# Patient Record
Sex: MALE | Race: ASIAN
[De-identification: names, ages, dates, MRNs, and addresses within clinical notes are randomized per-mention and may not be internally consistent; named-entity substitution may affect disease eponyms.]

---

## 2020-11-20 ENCOUNTER — HOSPITAL ENCOUNTER (EMERGENCY)
Dept: HOSPITAL 76 - ED | Age: 65
Discharge: HOME | End: 2020-11-20
Payer: SELF-PAY

## 2020-11-20 VITALS — DIASTOLIC BLOOD PRESSURE: 77 MMHG | SYSTOLIC BLOOD PRESSURE: 120 MMHG

## 2020-11-20 DIAGNOSIS — I10: ICD-10-CM

## 2020-11-20 DIAGNOSIS — N13.2: Primary | ICD-10-CM

## 2020-11-20 LAB
ALBUMIN DIAFP-MCNC: 4.3 G/DL (ref 3.2–5.5)
ALBUMIN/GLOB SERPL: 1.5 {RATIO} (ref 1–2.2)
ALP SERPL-CCNC: 49 IU/L (ref 42–121)
ALT SERPL W P-5'-P-CCNC: 29 IU/L (ref 10–60)
ANION GAP SERPL CALCULATED.4IONS-SCNC: 9 MMOL/L (ref 6–13)
AST SERPL W P-5'-P-CCNC: 29 IU/L (ref 10–42)
BASOPHILS NFR BLD AUTO: 0 10^3/UL (ref 0–0.1)
BASOPHILS NFR BLD AUTO: 0.2 %
BILIRUB BLD-MCNC: 1.5 MG/DL (ref 0.2–1)
BUN SERPL-MCNC: 27 MG/DL (ref 6–20)
CALCIUM UR-MCNC: 8.9 MG/DL (ref 8.5–10.3)
CHLORIDE SERPL-SCNC: 105 MMOL/L (ref 101–111)
CLARITY UR REFRACT.AUTO: (no result)
CO2 SERPL-SCNC: 25 MMOL/L (ref 21–32)
CREAT SERPLBLD-SCNC: 1.5 MG/DL (ref 0.6–1.2)
EOSINOPHIL # BLD AUTO: 0 10^3/UL (ref 0–0.7)
EOSINOPHIL NFR BLD AUTO: 0.1 %
ERYTHROCYTE [DISTWIDTH] IN BLOOD BY AUTOMATED COUNT: 14 % (ref 12–15)
GLOBULIN SER-MCNC: 2.9 G/DL (ref 2.1–4.2)
GLUCOSE SERPL-MCNC: 117 MG/DL (ref 70–100)
GLUCOSE UR QL STRIP.AUTO: NEGATIVE MG/DL
HGB UR QL STRIP: 15.4 G/DL (ref 14–18)
KETONES UR QL STRIP.AUTO: NEGATIVE MG/DL
LIPASE SERPL-CCNC: 33 U/L (ref 22–51)
LYMPHOCYTES # SPEC AUTO: 1.9 10^3/UL (ref 1.5–3.5)
LYMPHOCYTES NFR BLD AUTO: 12.4 %
MCH RBC QN AUTO: 28.3 PG (ref 27–31)
MCHC RBC AUTO-ENTMCNC: 32.4 G/DL (ref 32–36)
MCV RBC AUTO: 87.2 FL (ref 80–94)
MONOCYTES # BLD AUTO: 1.5 10^3/UL (ref 0–1)
MONOCYTES NFR BLD AUTO: 9.9 %
MUCOUS THREADS URNS QL MICRO: (no result)
NEUTROPHILS # BLD AUTO: 11.7 10^3/UL (ref 1.5–6.6)
NEUTROPHILS # SNV AUTO: 15.2 X10^3/UL (ref 4.8–10.8)
NEUTROPHILS NFR BLD AUTO: 76.9 %
NITRITE UR QL STRIP.AUTO: NEGATIVE
PDW BLD AUTO: 8.8 FL (ref 7.4–11.4)
PH UR STRIP.AUTO: 7 PH (ref 5–7.5)
PLATELET # BLD: 231 10^3/UL (ref 130–450)
PROT SPEC-MCNC: 7.2 G/DL (ref 6.7–8.2)
PROT UR STRIP.AUTO-MCNC: NEGATIVE MG/DL
RBC # UR STRIP.AUTO: (no result) /UL
RBC MAR: 5.45 10^6/UL (ref 4.7–6.1)
SODIUM SERPLBLD-SCNC: 139 MMOL/L (ref 135–145)
SP GR UR STRIP.AUTO: 1.02 (ref 1–1.03)
SQUAMOUS URNS QL MICRO: (no result)
UROBILINOGEN UR QL STRIP.AUTO: (no result) E.U./DL
UROBILINOGEN UR STRIP.AUTO-MCNC: NEGATIVE MG/DL

## 2020-11-20 PROCEDURE — 81001 URINALYSIS AUTO W/SCOPE: CPT

## 2020-11-20 PROCEDURE — 83690 ASSAY OF LIPASE: CPT

## 2020-11-20 PROCEDURE — 85025 COMPLETE CBC W/AUTO DIFF WBC: CPT

## 2020-11-20 PROCEDURE — 80053 COMPREHEN METABOLIC PANEL: CPT

## 2020-11-20 PROCEDURE — 96374 THER/PROPH/DIAG INJ IV PUSH: CPT

## 2020-11-20 PROCEDURE — 99284 EMERGENCY DEPT VISIT MOD MDM: CPT

## 2020-11-20 PROCEDURE — 87086 URINE CULTURE/COLONY COUNT: CPT

## 2020-11-20 PROCEDURE — 36415 COLL VENOUS BLD VENIPUNCTURE: CPT

## 2020-11-20 PROCEDURE — 74177 CT ABD & PELVIS W/CONTRAST: CPT

## 2020-11-20 PROCEDURE — 81003 URINALYSIS AUTO W/O SCOPE: CPT

## 2020-11-20 PROCEDURE — 96375 TX/PRO/DX INJ NEW DRUG ADDON: CPT

## 2020-11-20 PROCEDURE — 96361 HYDRATE IV INFUSION ADD-ON: CPT

## 2020-11-20 NOTE — ED PHYSICIAN DOCUMENTATION
PD HPI ABD PAIN





- Stated complaint


Stated Complaint: ABD PAIN/VOMITING





- History obtained from


History obtained from: Patient





- History of Present Illness


Timing - onset: Last night, Yesterday


Timing - duration: Days (1)


Timing - details: Gradual onset, Still present (has improved quite a bit today 

but still hurting lower abd.)


Quality: Cramping, Aching, Pain


Location: Periumbilical, RLQ (inguinal area)


Radiation: No: Chest, Lower back, Right flank


Improved by: No: Eating, Vomiting


Worsened by: No: Eating


Associated symptoms: Nausea, Vomiting (couple of times last night, not today.). 

No: Fever, Hematemesis, Diarrhea, Constipation (has not had BM for couple days, 

but is not feeling constipated per se.), Dysuria, Near syncope / syncope, Loss 

of appetite


Similar symptoms before: Has not had sx before


Recently seen: Clinic (Walk In today and referred to ER for more timely testing 

of abdomen.)





Review of Systems


Constitutional: denies: Fever, Chills


Ears: reports: Loss of hearing (chronic, but reads lips well.)


Nose: denies: Rhinorrhea / runny nose, Congestion


Throat: denies: Sore throat


Respiratory: denies: Cough


GI: reports: Abdominal Pain, Nausea, Vomiting (last evening).  denies: Diarrhea


: denies: Dysuria, Frequency


Skin: denies: Rash


Musculoskeletal: denies: Back pain





PD PAST MEDICAL HISTORY





- Past Medical History


Cardiovascular: Hypertension


Respiratory: None


Neuro: None


Endocrine/Autoimmune: None





- Past Surgical History


Past Surgical History: No





- Present Medications


Home Medications: 


                                Ambulatory Orders











 Medication  Instructions  Recorded  Confirmed


 


HYDROcod/ACETAM 5/325 [Norco 5/325] 1 ea PO Q6H PRN #20 tablet 11/20/20 


 


Naproxen [EC-Naproxen] 500 mg PO BID #20 tablet. 11/20/20 


 


Ondansetron Odt [Zofran] 4 mg TL Q6H PRN #10 tablet 11/20/20 














- Allergies


Allergies/Adverse Reactions: 


                                    Allergies











Allergy/AdvReac Type Severity Reaction Status Date / Time


 


No Known Drug Allergies Allergy   Verified 11/20/20 14:24














- Living Situation


Living Arrangement: reports: At home





- Social History


Does the pt smoke?: No


Does the pt have substance abuse?: No





- Family History


Family history: reports: Other (diverticulitis)





PD ED PE NORMAL





- Vitals


Vital signs reviewed: Yes





- General


General: Alert and oriented X 3, No acute distress, Well developed/nourished





- HEENT


HEENT: Pharynx benign





- Neck


Neck: Supple, no meningeal sign, No adenopathy





- Cardiac


Cardiac: RRR, No murmur





- Respiratory


Respiratory: Clear bilaterally





- Abdomen


Abdomen: Normal bowel sounds, Soft, Non distended, No organomegaly, Other 

(Mildly tender in the lower periumbilical and suprapubic area and some to the 

right lower quadrant.  No guarding or percussion tenderness.  No CVA tenderness.

  No inguinal adenopathy nor hernias are felt.  Scrotum is nontender)





Results





- Vitals


Vitals: 


                               Vital Signs - 24 hr











  11/20/20 11/20/20





  14:19 16:14


 


Temperature 37.1 C 


 


Heart Rate 56 L 53 L


 


Respiratory 18 17





Rate  


 


Blood Pressure 137/97 H 120/77


 


O2 Saturation 98 96








                                     Oxygen











O2 Source                      Room air

















- Labs


Labs: 


                                Laboratory Tests











  11/20/20 11/20/20 11/20/20





  14:30 14:45 14:45


 


WBC   15.2 H 


 


RBC   5.45 


 


Hgb   15.4 


 


Hct   47.5 


 


MCV   87.2 


 


MCH   28.3 


 


MCHC   32.4 


 


RDW   14.0 


 


Plt Count   231 


 


MPV   8.8 


 


Neut # (Auto)   11.7 H 


 


Lymph # (Auto)   1.9 


 


Mono # (Auto)   1.5 H 


 


Eos # (Auto)   0.0 


 


Baso # (Auto)   0.0 


 


Absolute Nucleated RBC   0.00 


 


Nucleated RBC %   0.0 


 


Sodium    139


 


Potassium    3.8


 


Chloride    105


 


Carbon Dioxide    25


 


Anion Gap    9.0


 


BUN    27 H


 


Creatinine    1.5 H


 


Estimated GFR (MDRD)    47 L


 


Glucose    117 H


 


Calcium    8.9


 


Total Bilirubin    1.5 H


 


AST    29


 


ALT    29


 


Alkaline Phosphatase    49


 


Total Protein    7.2


 


Albumin    4.3


 


Globulin    2.9


 


Albumin/Globulin Ratio    1.5


 


Lipase    33


 


Urine Color  YELLOW  


 


Urine Clarity  HAZY  


 


Urine pH  7.0  


 


Ur Specific Gravity  1.020  


 


Urine Protein  NEGATIVE  


 


Urine Glucose (UA)  NEGATIVE  


 


Urine Ketones  NEGATIVE  


 


Urine Occult Blood  MODERATE H  


 


Urine Nitrite  NEGATIVE  


 


Urine Bilirubin  NEGATIVE  


 


Urine Urobilinogen  0.2 (NORMAL)  


 


Ur Leukocyte Esterase  NEGATIVE  


 


Urine RBC  11-25 H  


 


Urine WBC  4-5  


 


Ur Squamous Epith Cells  RARE Squamous  


 


Urine Bacteria  None Seen  


 


Urine Mucus  Few Strands  


 


Ur Microscopic Review  INDICATED  


 


Urine Culture Comments  NOT INDICATED  














- Rads (name of study)


  ** abd CT


Radiology: Prelim report reviewed (right mid ureteral 5 mm stone and 3 mm stone.

 mild hydroureter/nephrosis. Normal appendix. ), See rad report





PD MEDICAL DECISION MAKING





- ED course


Complexity details: reviewed old records, reviewed results, re-evaluated patient

 (feeling okay at this time. ), considered differential (Is very mild tenderness

 in the suprapubic area and somewhat to the right.  There is no percussion or 

rebound tenderness.  No hernias are felt.  Get labs and a CT scan to evaluate 

for concerns of diverticulitis or early appendicitis.), d/w patient





Departure





- Departure


Disposition: 01 Home, Self Care


Clinical Impression: 


 Right lower quadrant abdominal pain, Ureterolithiasis





Condition: Stable


Record reviewed to determine appropriate education?: Yes


Instructions:  ED Stone Renal W Colic


Follow-Up: 


Sebastien Wilson MD [Provider Admit Priv/Credential] - 


Prescriptions: 


Naproxen [EC-Naproxen] 500 mg PO BID #20 tablet.


HYDROcod/ACETAM 5/325 [Norco 5/325] 1 ea PO Q6H PRN #20 tablet


 PRN Reason: Pain


Ondansetron Odt [Zofran] 4 mg TL Q6H PRN #10 tablet


 PRN Reason: Nausea / Vomiting


Comments: 


Her abdominal pain is being caused by a kidney stone trying to pass down the 

ureter.  It small enough that it should be able to over the next few days.





Stay adequately hydrated.  Anti-inflammatory of naproxen twice daily with food 

for the next 7 to 10 days.  Ondansetron if needed for nausea.  Add Tylenol or 

hydrocodone as needed for worse pain.





Recheck if not improved well over the next several days to a week.  Follow-up 

with urology if persistent symptoms.





Return to the ER if severe pain on relieved with oral medication.  Or other prob

lems.


Discharge Date/Time: 11/20/20 16:55

## 2020-11-20 NOTE — CT REPORT
PROCEDURE:  Abdomen/Pelvis W

 

INDICATIONS:  lower abd pain since yest

 

CONTRAST:  IV CONTRAST: Optiray 320 ml: 100 PO CONTRAST: *NO PO CONTRAST 

 

TECHNIQUE:  

After the administration of     contrast, 5 mm thick sections acquired from the diaphragms to the sym
physis.  5 mm thick coronal and sagittal reformats were acquired.  For radiation dose reduction, the 
following was used:  automated exposure control, adjustment of mA and/or kV according to patient size
.  

 

COMPARISON:  None.

 

FINDINGS:  

Image quality:  Excellent.  

 

ABDOMEN:  

Lung bases:  Lung bases are clear.  Heart size is normal.  

 

Solid organs:  Liver and spleen are normal in size and enhancement.  Gallbladder appears normal  Bili
del system is non dilated.  Pancreas enhances normally.  No adrenal nodules.  Kidneys demonstrate nor
mal size but asymmetric enhancement, slightly reduced on the right when compared to normal enhancemen
t on the left, without left-sided hydronephrosis.  There is mild right-sided hydronephrosis and proxi
mal right hydroureter associated with presence of a set of 2 adjacent calculi within the proximal rig
ht ureter the superior of which measures 2 mm in diameter and is seen on CT series 3 image 50. A larg
er adjacent lower calculus can be seen measuring 5 mm in maximal dimension on image 56, and then more
 inferiorly in the expected course of the far distal right ureter a 1 mm punctate calculus is seen, v
ersus an immediate adjacent phlebolith. On the left there is a 2 x 4 mm calculus at the lower third c
ollecting system of the left kidney, nonobstructive.

 

Peritoneum and bowel:  Bowel loops demonstrate normal wall thickness and caliber.  No free fluid or a
ir.  

 

Nodes and vessels:  No retroperitoneal or mesenteric adenopathy by size criteria.  Aorta and inferior
 vena cava are normal in size.  

 

Miscellaneous:  No ventral hernias.  

 

 

PELVIS:  

Genitourinary:  Bladder wall thickness is normal.  

 

Miscellaneous:  No inguinal hernias or adenopathy.  

 

Bones:  No suspicious bony lesions.  No vertebral body compression fractures.  

 

IMPRESSION:  Mild right-sided hydronephrosis and secondary mild reduction of renal cortical enhanceme
nt on the right associated with hydronephrosis present related to 2 adjacent calculi present within t
he proximal right ureter, one of which measures 2 mm and the second measures 5 mm immediately below. 
A third possible calculus measures only 1 mm at the far distal right ureter near the bladder margin. 
This could represent a phlebolith instead.

 

A nonobstructive calculus measuring 2 x 4 mm is present within the lower third collecting system, non
obstructive, on the left.

 

Reviewed by: Jay Rivera MD on 11/20/2020 4:03 PM PST

Approved by: Jay Rivera MD on 11/20/2020 4:03 PM PST

 

 

Station ID:  [id number]

## 2021-01-02 ENCOUNTER — HOSPITAL ENCOUNTER (OUTPATIENT)
Dept: HOSPITAL 76 - LAB.R | Age: 66
Discharge: HOME | End: 2021-01-02
Attending: FAMILY MEDICINE
Payer: SELF-PAY

## 2021-01-02 DIAGNOSIS — N39.0: Primary | ICD-10-CM

## 2021-01-02 PROCEDURE — 87086 URINE CULTURE/COLONY COUNT: CPT

## 2024-09-09 ENCOUNTER — HOSPITAL ENCOUNTER (OUTPATIENT)
Dept: HOSPITAL 76 - LAB.N | Age: 69
Discharge: HOME | End: 2024-09-09
Payer: MEDICARE

## 2024-09-09 DIAGNOSIS — N40.0: Primary | ICD-10-CM

## 2024-09-09 LAB
CLARITY UR REFRACT.AUTO: CLEAR
GLUCOSE UR QL STRIP.AUTO: NEGATIVE MG/DL
KETONES UR QL STRIP.AUTO: NEGATIVE MG/DL
NITRITE UR QL STRIP.AUTO: NEGATIVE
PH UR STRIP.AUTO: 8 PH (ref 5–7.5)
PROT UR STRIP.AUTO-MCNC: NEGATIVE MG/DL
RBC # UR STRIP.AUTO: NEGATIVE /UL
SP GR UR STRIP.AUTO: 1.01 (ref 1–1.03)
SQUAMOUS URNS QL MICRO: (no result)
UROBILINOGEN UR QL STRIP.AUTO: (no result) E.U./DL
UROBILINOGEN UR STRIP.AUTO-MCNC: NEGATIVE MG/DL
WBC # UR MANUAL: (no result) /HPF (ref 0–3)

## 2024-09-09 PROCEDURE — 81001 URINALYSIS AUTO W/SCOPE: CPT

## 2024-09-09 PROCEDURE — 87086 URINE CULTURE/COLONY COUNT: CPT

## 2024-09-11 ENCOUNTER — HOSPITAL ENCOUNTER (OUTPATIENT)
Dept: HOSPITAL 76 - SDS | Age: 69
Discharge: HOME | End: 2024-09-11
Attending: SURGERY
Payer: MEDICARE

## 2024-09-11 VITALS — SYSTOLIC BLOOD PRESSURE: 108 MMHG | DIASTOLIC BLOOD PRESSURE: 72 MMHG | OXYGEN SATURATION: 97 %

## 2024-09-11 DIAGNOSIS — Z12.11: Primary | ICD-10-CM

## 2024-09-11 DIAGNOSIS — I10: ICD-10-CM

## 2024-09-11 DIAGNOSIS — N40.0: ICD-10-CM

## 2024-09-11 DIAGNOSIS — K64.1: ICD-10-CM

## 2024-09-11 DIAGNOSIS — K63.5: ICD-10-CM

## 2024-09-11 PROCEDURE — 0DBN8ZX EXCISION OF SIGMOID COLON, VIA NATURAL OR ARTIFICIAL OPENING ENDOSCOPIC, DIAGNOSTIC: ICD-10-PCS | Performed by: SURGERY

## 2024-09-11 PROCEDURE — 45380 COLONOSCOPY AND BIOPSY: CPT

## 2024-09-11 RX ADMIN — SODIUM CHLORIDE, POTASSIUM CHLORIDE, SODIUM LACTATE AND CALCIUM CHLORIDE ONE MLS: 600; 310; 30; 20 INJECTION, SOLUTION INTRAVENOUS at 12:02

## 2024-09-11 RX ADMIN — SODIUM CHLORIDE, POTASSIUM CHLORIDE, SODIUM LACTATE AND CALCIUM CHLORIDE ONE MLS: 600; 310; 30; 20 INJECTION, SOLUTION INTRAVENOUS at 08:39

## 2024-09-11 RX ADMIN — SODIUM CHLORIDE, POTASSIUM CHLORIDE, SODIUM LACTATE AND CALCIUM CHLORIDE ONE MLS: 600; 310; 30; 20 INJECTION, SOLUTION INTRAVENOUS at 12:40

## 2024-09-11 NOTE — ANESTHESIA POST OP EVALUATION
Anesthesia Post Eval





- Post Anesthesia Eval


Vitals: 





                                Last Vital Signs











Temp  36.5 C   09/11/24 12:10


 


Pulse  57 L  09/11/24 12:20


 


Resp  16   09/11/24 12:20


 


BP  108/72   09/11/24 12:20


 


Pulse Ox  97   09/11/24 12:20


 


O2 Flow Rate      











CV Function Including HR & BP: Stable


Pain Control: Satisfactory


Nausea & Vomiting: Negative


Mental Status: Baseline


Respiratory Status: Airway Patent


Hydration Status: Satisfactory


Anesthesia Complications: None

## 2024-09-11 NOTE — ANESTHESIA
Pre-Anesthesia VS, & Labs





- Diagnosis





screening exam





- Procedure





colonoscopy


Vital Signs: 





                                        











Temp Pulse Resp BP Pulse Ox O2 Flow Rate


 


 36.5 C   56 L  18   129/86 H  100    


 


 09/11/24 08:45  09/11/24 08:45  09/11/24 08:45  09/11/24 08:45  09/11/24 08:45 

 











Height: 6 ft


Weight (kg): 85.1 kg


Body Mass Index: 25.4


BMI Classification: Overweight





- NPO


>8 hours





Home Medications and Allergies


Home Medications: 


Ambulatory Orders





Tamsulosin [Flomax] 0.4 mg PO DAILY 09/11/24 











                                        





Tamsulosin [Flomax] 0.4 mg PO DAILY 09/11/24 








Allergies/Adverse Reactions: 


                                    Allergies











Allergy/AdvReac Type Severity Reaction Status Date / Time


 


No Known Drug Allergies Allergy   Verified 11/20/20 14:24














Anes History & Medical History





- Anesthetic History


Anesthesia Complications: reports: No previous complications





- Medical History


Cardiovascular: reports: Hypertension


Pulmonary: reports: None


Gastrointestinal: reports: None


Urinary: reports: Benign prostate hypertrophy


Neuro: reports: None


Endocrine/Autoimmune: reports: None


Smoking Status: Never smoker


Psychosocial: reports: Alcohol (frequent), Cannabis (daily use)


History of Cancer?: No





- Surgical History


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


Orthopedic: reports: Other (Thumb)





Exam


General: Alert, Oriented x3, Cooperative, No acute distress


Dental: WNL


Mouth Opening: 3 Fingerbreadth


Neck Mobility: Normal


Mallampati classification: II


Thyromental Distance: 4-6 cm


Mental/Cognitive Status: Alert/Oriented X3, Normal for patient





Plan


Anesthesia Type: General, Total IV


Consent for Procedure(s) Verified and Reviewed: Yes


Code Status: Attempt Resuscitation


ASA classification: 2-Mild systemic disease


Is this case an emergency?: No